# Patient Record
Sex: FEMALE | Race: WHITE
[De-identification: names, ages, dates, MRNs, and addresses within clinical notes are randomized per-mention and may not be internally consistent; named-entity substitution may affect disease eponyms.]

---

## 2017-03-09 ENCOUNTER — HOSPITAL ENCOUNTER (OUTPATIENT)
Dept: HOSPITAL 80 - FCATH | Age: 62
Discharge: HOME | End: 2017-03-09
Attending: INTERNAL MEDICINE
Payer: COMMERCIAL

## 2017-03-09 DIAGNOSIS — I48.0: ICD-10-CM

## 2017-03-09 DIAGNOSIS — I10: ICD-10-CM

## 2017-03-09 DIAGNOSIS — Z95.2: ICD-10-CM

## 2017-03-09 DIAGNOSIS — I44.2: ICD-10-CM

## 2017-03-09 DIAGNOSIS — I47.2: ICD-10-CM

## 2017-03-09 DIAGNOSIS — J38.00: ICD-10-CM

## 2017-03-09 DIAGNOSIS — I48.92: Primary | ICD-10-CM

## 2017-03-09 DIAGNOSIS — Z79.01: ICD-10-CM

## 2017-03-09 DIAGNOSIS — E03.9: ICD-10-CM

## 2017-03-09 DIAGNOSIS — I25.10: ICD-10-CM

## 2017-03-09 DIAGNOSIS — Z95.1: ICD-10-CM

## 2017-03-09 DIAGNOSIS — R49.0: ICD-10-CM

## 2017-03-09 DIAGNOSIS — Z86.718: ICD-10-CM

## 2017-03-09 DIAGNOSIS — D68.2: ICD-10-CM

## 2017-03-09 DIAGNOSIS — Z95.0: ICD-10-CM

## 2017-03-09 DIAGNOSIS — E78.00: ICD-10-CM

## 2017-03-09 LAB
% IMMATURE GRANULYOCYTES: 0.4 % (ref 0–1.1)
ABSOLUTE IMMATURE GRANULOCYTES: 0.03 10^3/UL (ref 0–0.1)
ABSOLUTE NRBC COUNT: 0 10^3/UL (ref 0–0.01)
ADD DIFF?: NO
ADD MORPH?: NO
ADD SCAN?: NO
ANION GAP SERPL CALC-SCNC: 13 MEQ/L (ref 8–16)
APTT BLD: 27.5 SEC (ref 23–38)
ATYPICAL LYMPHOCYTE FLAG: 10 (ref 0–99)
CALCIUM SERPL-MCNC: 10.1 MG/DL (ref 8.5–10.4)
CHLORIDE SERPL-SCNC: 106 MEQ/L (ref 97–110)
CO2 SERPL-SCNC: 26 MEQ/L (ref 22–31)
CREAT SERPL-MCNC: 1.2 MG/DL (ref 0.6–1)
ERYTHROCYTE [DISTWIDTH] IN BLOOD BY AUTOMATED COUNT: 16.3 % (ref 11.5–15.2)
FRAGMENT RBC FLAG: 0 (ref 0–99)
GFR SERPL CREATININE-BSD FRML MDRD: 46 ML/MIN/{1.73_M2}
GLUCOSE SERPL-MCNC: 100 MG/DL (ref 70–100)
HCT VFR BLD CALC: 39.6 % (ref 38–47)
HGB BLD-MCNC: 12.6 G/DL (ref 12.6–16.3)
INR PPP: 1.29 (ref 0.83–1.16)
LEFT SHIFT FLG: 0 (ref 0–99)
LIPEMIA HEMOLYSIS FLAG: 80 (ref 0–99)
MAGNESIUM SERPL-MCNC: 2 MG/DL (ref 1.6–2.3)
MCH RBC BLDCO QN: 24.8 PG (ref 27.9–34.1)
MCHC RBC AUTO-ENTMCNC: 31.8 G/DL (ref 32.4–36.7)
MCV RBC AUTO: 77.8 FL (ref 81.5–99.8)
NRBC-AUTO%: 0 % (ref 0–0.2)
PLATELET # BLD: 216 10^3/UL (ref 150–400)
PLATELET CLUMPS FLAG: 0 (ref 0–99)
PMV BLD AUTO: 9.2 FL (ref 8.7–11.7)
POTASSIUM SERPL-SCNC: 4.1 MEQ/L (ref 3.5–5.2)
PROTHROMBIN TIME: 16.1 SEC (ref 12–15)
RBC # BLD AUTO: 5.09 10^6/UL (ref 4.18–5.33)
SODIUM SERPL-SCNC: 145 MEQ/L (ref 134–144)

## 2017-03-09 PROCEDURE — 93005 ELECTROCARDIOGRAM TRACING: CPT

## 2017-03-09 PROCEDURE — 5A1223Z PERFORMANCE OF CARDIAC PACING, CONTINUOUS: ICD-10-PCS | Performed by: INTERNAL MEDICINE

## 2017-03-09 PROCEDURE — 02H63MZ INSERTION OF CARDIAC LEAD INTO RIGHT ATRIUM, PERCUTANEOUS APPROACH: ICD-10-PCS | Performed by: INTERNAL MEDICINE

## 2017-03-09 PROCEDURE — 4A023FZ MEASUREMENT OF CARDIAC RHYTHM, PERCUTANEOUS APPROACH: ICD-10-PCS | Performed by: INTERNAL MEDICINE

## 2017-03-09 PROCEDURE — 93613 INTRACARDIAC EPHYS 3D MAPG: CPT

## 2017-03-09 PROCEDURE — 93621 COMP EP EVL L PAC&REC C SINS: CPT

## 2017-03-09 PROCEDURE — C1730 CATH, EP, 19 OR FEW ELECT: HCPCS

## 2017-03-09 PROCEDURE — C1732 CATH, EP, DIAG/ABL, 3D/VECT: HCPCS

## 2017-03-09 PROCEDURE — 93623 PRGRMD STIMJ&PACG IV RX NFS: CPT

## 2017-03-09 PROCEDURE — 02K83ZZ MAP CONDUCTION MECHANISM, PERCUTANEOUS APPROACH: ICD-10-PCS | Performed by: INTERNAL MEDICINE

## 2017-03-09 PROCEDURE — 02H73MZ INSERTION OF CARDIAC LEAD INTO LEFT ATRIUM, PERCUTANEOUS APPROACH: ICD-10-PCS | Performed by: INTERNAL MEDICINE

## 2017-03-09 PROCEDURE — 02HK3MZ INSERTION OF CARDIAC LEAD INTO RIGHT VENTRICLE, PERCUTANEOUS APPROACH: ICD-10-PCS | Performed by: INTERNAL MEDICINE

## 2017-03-09 PROCEDURE — 93620 COMP EP EVL R AT VEN PAC&REC: CPT

## 2017-03-09 NOTE — CPEKG
Heart Rate: 79

RR Interval: 759

P-R Interval: 192

QRSD Interval: 88

QT Interval: 388

QTC Interval: 445

P Axis: -88

QRS Axis: -15

T Wave Axis: 65

EKG Severity - ABNORMAL ECG -

EKG Impression: ATRIAL-PACED COMPLEXES

EKG Impression: PROBABLE INFERIOR INFARCT, OLD

EKG Impression: CONSIDER ANTERIOR INFARCT

EKG Impression: COMPARED WITH 12/23/2016, ATRIAL PACING NOW PRESENT

Electronically Signed By: Jessica Sam 09-Mar-2017 15:14:29

## 2017-03-09 NOTE — EPPROC
Electrophysiology Procedure Note: 





DIAGNOSTIC ELECTROPHYSIOLOGIC STUDY 





Procedures performed:





* Fluoroscopy


* 85220-78 EP evaluation with RA/RV/LA pace/record, with arrhythmia induction


* 45079-60 EP evaluation with RA/RV pace record, insert/reposition catheter, 

with arrhythmia induction


* 3D mapping


4.   11666-62   Programmed stimulation + pacing after IV drug








INDICATION:





 THis is a 61 yr old female with episodes of atrial flutter and NSVT.  In view 

of this it was decided to perform EP study with plans to perform ablation for 

right sided flutter and eval for induction of sustained VT





PROCEDURE:





Catheters & Anesthesia: 





The patient arrived in the Electrophysiology Laboratory in the fasting state.  

The right clavicular region, right groin, & left groin area were prepped & 

draped in the usual sterile manner.  General anesthesia was administered.  

Appropriate non-invasive blood pressure, pulse oximetry & end-tidal CO2 

monitoring was established.


All catheters were placed percutaneously using the modified Seldinger technique

, and advanced into position under fluoroscopic guidance. One #6 Ukrainian 

decapolar catheter was palced in the CS via RFV, another decapolar catheter was 

placed in the ALRA position via RFV.  


One F curve thermacool catheter was placed in the Isthmus.


 





Programmed stimulation was performed from the right atrium, right ventricle and 

coronary sinus (left atrium).   


Sustained atrial arrhytmia was induced this was noted to be LA arrhythmia.


Mapping of the isthmus was performed during CS pacing and it was noted that 

there was no gap in the isthmus. Since the pt will have to continue with 

Coumadin even after LA tachycardia was ablated, it was deemed that pt will 

continue to have other ongoing arrhytmias from the LA considering the substrate 

and hence it was decided to continue with coumadin and discuss with the pt 

regarding use of AAD for LA arrhythmias








No sustained reentrant tachycardia was induced during programmed stimulation at 

baseline or during graded doses of isoproterenol up to 1  mcg/min.  





Ventricular programmed stimulation was performed using standard protocol (2 

pacing sites, 2 basic cycle lengths, up to 2 extrastimuli at twice pacing 

threshold).  Ventricular burst pacing and long/short sequence pacing was also 

performed.








The catheters were removed.  The patient was transferred to the cardiovascular 

holding area in stable condition.  Vascular access sheaths were removed in the 

holding area.  There were no apparent complications.





Results:





* LA arrhythmia


* No inducible sustained VT


* 





CONCLUSIONS





* Normal sinus and AV node function. 


* Sustained LA arrhythmia


* No sustained ventricular arrhythmias induced.


* No apparent complications.








Patient Problems: 


 Problems











Problem Status Onset


 


High degree atrioventricular block Acute  


 


S/P CABG x 1 Acute  10/24/16


 


S/P ablation of atrial fibrillation Acute  10/24/16


 


S/P aortic valve replacement with bioprosthetic valve Acute  10/24/16


 


S/P cardiac pacemaker procedure Acute  10/28/16


 


S/P mitral valve replacement with bioprosthetic valve Acute  10/24/16


 


CAD in native artery Chronic  


 


Dyslipidemia Chronic  


 


Factor V deficiency Chronic  


 


Obesity (BMI 30-39.9) Chronic  


 


Persistent atrial fibrillation Chronic

## 2017-04-01 ENCOUNTER — HOSPITAL ENCOUNTER (OUTPATIENT)
Dept: HOSPITAL 80 - FIMAGING | Age: 62
End: 2017-04-01
Attending: GENERAL ACUTE CARE HOSPITAL
Payer: COMMERCIAL

## 2017-04-01 DIAGNOSIS — Z80.3: ICD-10-CM

## 2017-04-01 DIAGNOSIS — Z12.31: Primary | ICD-10-CM

## 2017-04-01 PROCEDURE — G0202 SCR MAMMO BI INCL CAD: HCPCS

## 2017-08-04 ENCOUNTER — HOSPITAL ENCOUNTER (OUTPATIENT)
Dept: HOSPITAL 80 - FCATH | Age: 62
Discharge: HOME | End: 2017-08-04
Attending: INTERNAL MEDICINE
Payer: COMMERCIAL

## 2017-08-04 DIAGNOSIS — I25.10: ICD-10-CM

## 2017-08-04 DIAGNOSIS — Z95.0: ICD-10-CM

## 2017-08-04 DIAGNOSIS — I48.0: Primary | ICD-10-CM

## 2017-08-04 DIAGNOSIS — I10: ICD-10-CM

## 2017-08-04 DIAGNOSIS — Z95.2: ICD-10-CM

## 2017-08-04 DIAGNOSIS — D68.2: ICD-10-CM

## 2017-08-04 DIAGNOSIS — E78.5: ICD-10-CM

## 2017-08-04 DIAGNOSIS — Z79.01: ICD-10-CM

## 2017-08-04 LAB
ANION GAP SERPL CALC-SCNC: 13 MEQ/L (ref 8–16)
APTT BLD: 38.3 SEC (ref 23–38)
CALCIUM SERPL-MCNC: 10 MG/DL (ref 8.5–10.4)
CHLORIDE SERPL-SCNC: 106 MEQ/L (ref 97–110)
CO2 SERPL-SCNC: 23 MEQ/L (ref 22–31)
CREAT SERPL-MCNC: 1 MG/DL (ref 0.6–1)
GFR SERPL CREATININE-BSD FRML MDRD: 56 ML/MIN/{1.73_M2}
GLUCOSE SERPL-MCNC: 95 MG/DL (ref 70–100)
INR PPP: 2.56 (ref 0.83–1.16)
MAGNESIUM SERPL-MCNC: 1.9 MG/DL (ref 1.6–2.3)
POTASSIUM SERPL-SCNC: 4 MEQ/L (ref 3.5–5.2)
PROTHROMBIN TIME: 27.8 SEC (ref 12–15)
SODIUM SERPL-SCNC: 142 MEQ/L (ref 134–144)

## 2017-08-04 PROCEDURE — 5A2204Z RESTORATION OF CARDIAC RHYTHM, SINGLE: ICD-10-PCS | Performed by: INTERNAL MEDICINE

## 2017-08-04 NOTE — CPEKG
Heart Rate: 64

RR Interval: 938

P-R Interval: 176

QRSD Interval: 88

QT Interval: 436

QTC Interval: 450

QRS Axis: -22

T Wave Axis: 87

EKG Severity - ABNORMAL ECG -

EKG Impression: ATRIAL-PACED RHYTHM

EKG Impression: BORDERLINE LEFT AXIS DEVIATION

EKG Impression: CONSIDER ANTERIOR INFARCT

Electronically Signed By: Josse Mirza 05-Aug-2017 07:02:32

## 2017-08-04 NOTE — PDANEPAE
ANE History of Present Illness





s/p AVR MVR MAZE procedure 2016; now with luis ALEXANDER Past Medical History





- Cardiovascular History


Hx Hypertension: Yes


Hx Arrhythmias: Yes


Hx Chest Pain: No


Hx Coronary Artery / Peripheral Vascular Disease: Yes


Hx CHF / Valvular Disease: Yes


Hx Palpitations: No


Cardiovascular History Comment: PAF.  HYPERLIPIDEMIA.  HYPERCHOLESTEREMIA.  

HTN.  MITRAL REGURG AND STENOSIS.  AORTIC REGURG.  CAD.  HX OF DVT





- Pulmonary History


Hx COPD: No


Hx Asthma/Reactive Airway Disease: No


Hx Recent Upper Respiratory Infection: No


Hx Oxygen in Use at Home: No


Hx Sleep Apnea: No


Pulmonary History Comment: KAITY TRIGGERS NO DX





- Neurologic History


Hx Cerebrovascular Accident: No


Hx Seizures: No


Hx Dementia: No


Neurologic History Comment: PINCHED NERVE WITH SIATICA





- Endocrine History


Hx Diabetes: No


Endocrine History Comment: PRE-DIABETIC.  HYPOTHYROIDISM





- Renal History


Hx Renal Disorders: No


Renal History Comment: KEEPING AN EYE ON CREAT





- Liver History


Hx Hepatic Disorders: No





- Neurological & Psychiatric Hx


Hx Neurological and Psychiatric Disorders: No





- Cancer History


Hx Cancer: No





- Congenital Disorder History


Hx Congenital Disorders: No





- GI History


Hx Gastrointestinal Disorders: No





- Other Health History


Other Health History: FACTOR V LEIDEN





- Chronic Pain History


Chronic Pain: Yes (LOWER BACK PAIN)





- Surgical History


Prior Surgeries: NA





ANE Review of Systems





- Exercise capacity


Exercise capacity: >=4 METS





- Pacemaker


Pacemaker : RogateronidiaDexus





BENJAMIN Patient History





- Allergies


Allergies/Adverse Reactions: 








morphine Allergy (Intermediate, Verified 10/24/16 07:17)


 Vomiting








- Home Medications


Home Medications: 








Atenolol [Tenormin 25 mg (*)] 25 mg PO BID 01/27/11 [Last Taken 1 Day Ago]


Levothyroxine [Synthroid 75 mcg (*)] 75 mcg PO DAILY06 01/27/11 [Last Taken 1 

Day Ago]


Atorvastatin Calcium [Lipitor 40 mg (*)] 40 mg PO HS 09/30/16 [Last Taken 1 Day 

Ago]


Calcium Carbonate [Tums 500MG (*)] 1,000 mg PO DAILY 09/30/16 [Last Taken 1 Day 

Ago]


Herbals/Supplements -Info Only 1 ea PO DAILY 10/21/16 [Last Taken 1 Day Ago]








- Anes Hx


Anes Hx: no prior problems





- Smoking Hx


Smoking Status: Never smoked





- Family Anes Hx


Family Hx Anesthesia Complications: NONE





ANE Labs/Vital Signs





- Labs


Result Diagrams: 


 08/04/17 08:05





- Vital Signs


Height: 170.18 cm


Weight: 99.79 kg





ANE Physical Exam





- Airway


Neck exam: FROM


Mallampati Score: Class 1


Mouth exam: normal dental/mouth exam





- Pulmonary


Pulmonary: no respiratory distress





- Cardiovascular


Cardiovascular: irregularly irregular





- ASA Status


ASA Status: IV





ANE Anesthesia Plan


Anesthesia Plan: GA with mask

## 2017-08-04 NOTE — CPEKG
Heart Rate: 62

RR Interval: 968

QRSD Interval: 94

QT Interval: 464

QTC Interval: 472

QRS Axis: -17

T Wave Axis: 97

EKG Severity - ABNORMAL ECG -

EKG Impression: A-FLUTTER W/ PREDOM 4:1 AV BLOCK, A-RATE 245

EKG Impression: ST ELEVATION, PROBABLE INFERIOR INJURY

EKG Impression: CONSIDER ANTERIOR INFARCT

EKG Impression: ABNORMAL T, CONSIDER ISCHEMIA, ANT-LAT LEADS

Electronically Signed By: Josse Mirza 05-Aug-2017 07:02:48

## 2018-05-07 ENCOUNTER — HOSPITAL ENCOUNTER (OUTPATIENT)
Dept: HOSPITAL 80 - FIMAGING | Age: 63
End: 2018-05-07
Attending: FAMILY MEDICINE
Payer: COMMERCIAL

## 2018-05-07 DIAGNOSIS — Z80.3: ICD-10-CM

## 2018-05-07 DIAGNOSIS — Z12.31: Primary | ICD-10-CM

## 2018-11-09 NOTE — GHP
DATE OF ADMISSION:  2018



SCHEDULED DATE OF SURGERY:  2016, at 7:15 a.m.



SURGERY TO BE PERFORMED:  Hysteroscopy, dilation and curettage, polypectomy with morcellator.



PREOPERATIVE DIAGNOSIS:  Postmenopausal bleeding and endometrial polyps.



HISTORY OF PRESENT ILLNESS:  The patient is a 63-year-old,  2, para 2-0-0-2, who presented to 
my office complaining of episodes of postmenopausal bleeding.  She has had episodes of postmenopausal
 bleeding over the last couple years.  She had seen another provider in  who because of this prob
rachel ordered an ultrasound that diagnosed a thickened endometrium.  She recommended patient have a cou
rse of oral progesterone to thin her endometrium because of bleed, and patient was reluctant to do th
is because of her complicated medical history and concern about hormones; and so she did not do this 
therapy and did not follow up.  Over the last 2 years she has had episodes of postmenopausal bleeding
 which increased substantially in the last month.  She had 1 week of heavy flow that was bright red, 
prior had been more significant for spotting.  My office, we proceeded with an ultrasound, and ultras
ound revealed a heterogeneous, thickened endometrium which was 1.81 cm, had multiple cystic areas thr
oughout, had good flow; ovaries were not well seen, but no masses in her adnexa.  I performed an endo
metrial biopsy which showed endometrial polyp fragments and some atrophic endometrium but no evidence
 of hyperplasia or malignancy, no atypical cells.  We discussed treatment options, and I still feel t
hat she needs surgical management with a hysteroscopy, D and C and polypectomy to ensure that we have
 sampled the entire endometrium and completely ruled out the possibility of endometrial malignancy as
 well as treatment of the polyp and hopefully prevention of further postmenopausal bleeding episodes 
for her.  She is in agreement and wants to proceed with this course.  



The patient has a complex medical history with mainly cardiac issues.  She has coronary artery diseas
e and valvular disease.  She underwent an aortic valve replacement and a coronary artery bypass graft
 surgery with her internal mammary artery in 2016 as well as a mitral valve replacement an
d a Maze procedure.  She had a pacemaker placed following this procedure because of episodes of atria
l fibrillation and has an internal defibrillator.  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
r different years, and she is positive for Factor V Leiden.  She is on Coumadin for life because of h
er hypercoagulable disorder as well as her mechanical heart valve.  She also has hypertension, hyperc
holesterolemia, hypothyroidism and a history of rheumatic fever in childhood.  She is followed by Dr. Juan Roman at [facility], who did give cardiac clearance for this surgery.  She had a recent car
diac catheterization in , demonstrated no significant obstructive disease.  She is moderately act
neymar without angina.  He does recommend prophylactic antibiotics prior to surgery because of her prost
hetic heart valves, and she will require pacemaker check following her surgery, which they will arran
ge.  She is on Coumadin for her atrial fibrillation, but she has not had any further episodes of A-fi
b on her pacemaker check.  He gave permission for her to hold her Coumadin for 4 days prior to surger
y and will resume as appropriate postoperatively.



PAST SURGICAL HISTORY:  The above-mentioned cardiac procedure in 2016, aortic and mitral v
alve replacement, coronary artery bypass graft procedure, Maze procedure and pacemaker placement, and
 that is her only significant surgery.



ALLERGIES:  She has no known drug allergies.



PAST OBSTETRICAL HISTORY:  She had 2 full-term vaginal deliveries in  and .  She did have pre
eclampsia and severe hypertension with her first baby in  and was placed on medications temporari
ly.  No other pregnancies.



PAST GYNECOLOGICAL HISTORY:  She went through menopause at age 51.  She had normal periods in her lif
e.  Used oral contraceptive pills prior to her DVT and stopped after that.  She is , has been 
 for 38 years.  Remains sexually active with some vaginal dryness but no significant issues.  
She did have a history of an abnormal Pap smear and had a cryotherapy to her cervix in , had no a
bnormal Paps since; and has had a Pap in , and I performed a Pap in 2018, which was neg
ative, as well as HPV.  Patient has regular mammograms.  Is up to date on colonoscopy as well as a lydia
ne density.



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  
e denies tobacco use, 1 alcohol drink a week, no drug use.  She, again, is , lives with her 
alexa.



FAMILY HISTORY:  Significant for cardiovascular disease.  Her mother had a stroke at age 68.  Two bro
thers:  One is age 37; one age 50 had myocardial infarction, hypercholesterolemia.  Hypertension in b
oth her parents and her brother.  Type 2 diabetes in her father, her mother and her sister, and that 
is all.



OBJECTIVE:  VITALS:  Patient's blood pressure 124/76, weight is 233 pounds.  She is 5 feet, 4-1/2 inc
hes.  GENERAL:  She is a well-developed, obese, white female, no acute distress.  LUNGS:  Clear to au
scultation bilaterally.  HEART:  Regular rhythm.  No murmurs.  ABDOMEN:  Soft, nontender, nondistende
d.  Normal bowel sounds.  PELVIC:  Atrophic external genitalia:  No lesions or abnormalities assessed
.  Normal parous cervix.  No obvious bleeding.  Uterus is anteverted, anteflexed, mobile, nontender, 
nondistended.



ASSESSMENT AND PLAN:  63-year-old  2, para 2-0-0-2, with episodes of postmenopausal bleeding, 
thickened endometrium on ultrasound and endometrial polyp revealed on endometrial biopsy.  She will u
ndergo hysteroscopy, dilation and curettage, polypectomy with morcellator.  Patient was consented for
 the procedure.  She understood the risks and benefits, the risks including bleeding, infection, brittany
ge to the uterus including possible risk of perforation, damage to other organs if perforation were t
o occur and need for additional procedures especially if this is a cancer diagnosis.  Patient is awar
e of risks of anesthesia including her cardiac risk, and her cardiologist feels that this procedure i
s safe for her.





Job #:  [id number]

## 2018-11-16 ENCOUNTER — HOSPITAL ENCOUNTER (OUTPATIENT)
Dept: HOSPITAL 80 - FSGY | Age: 63
Discharge: HOME | End: 2018-11-16
Attending: OBSTETRICS & GYNECOLOGY
Payer: COMMERCIAL

## 2018-11-16 VITALS — SYSTOLIC BLOOD PRESSURE: 151 MMHG | DIASTOLIC BLOOD PRESSURE: 97 MMHG

## 2018-11-16 DIAGNOSIS — Z95.0: ICD-10-CM

## 2018-11-16 DIAGNOSIS — Z95.1: ICD-10-CM

## 2018-11-16 DIAGNOSIS — Z86.718: ICD-10-CM

## 2018-11-16 DIAGNOSIS — Z95.2: ICD-10-CM

## 2018-11-16 DIAGNOSIS — N84.0: Primary | ICD-10-CM

## 2018-11-16 DIAGNOSIS — D68.51: ICD-10-CM

## 2018-11-16 DIAGNOSIS — Z79.01: ICD-10-CM

## 2018-11-16 DIAGNOSIS — I10: ICD-10-CM

## 2018-11-16 LAB
INR PPP: 0.98 (ref 0.83–1.16)
PROTHROMBIN TIME: 13.2 SEC (ref 12–15)

## 2018-11-16 PROCEDURE — C1782 MORCELLATOR: HCPCS

## 2018-11-16 PROCEDURE — 58558 HYSTEROSCOPY BIOPSY: CPT

## 2018-11-16 PROCEDURE — 0UB98ZZ EXCISION OF UTERUS, VIA NATURAL OR ARTIFICIAL OPENING ENDOSCOPIC: ICD-10-PCS | Performed by: OBSTETRICS & GYNECOLOGY

## 2018-11-16 NOTE — GOP
DATE OF OPERATION:  2018



SURGEON:  Kym Cartagena MD



ANESTHESIA:  General anesthesia with an LMA.



ANESTHESIOLOGIST:  Paras Echeverria MD.



PREOPERATIVE DIAGNOSIS:  Postmenopausal bleeding and endometrial polyps.



POSTOPERATIVE DIAGNOSIS:  Postmenopausal bleeding and endometrial polyps.



PROCEDURE PERFORMED:  Hysteroscopy, dilation, curettage, and polypectomy.



FINDINGS:  



SPECIMENS:  Pathologic specimen will be endometrial polyps, endometrial curettings.



ESTIMATED BLOOD LOSS:  Less than 10 cc.



INDICATIONS:  The patient is a 63-year-old  2, para 2-0-0-2, who presented complaining of epis
odes of postmenopausal bleeding that had been going on for the last few years.  She had seen another 
provider in  who ordered an ultrasound and diagnosed a thickened endometrium.  She was recommende
d to have an oral course of progesterone to thin her endometrium and cause of bleed and the patient w
as reluctant to do this because of her complicated medical history including positive thrombophilia, 
a history of DVTs and history of mechanical heart valve.  She continued to have episodic postmenopaus
al bleeding until she saw me.  I repeated an ultrasound which revealed a heterogeneous uterus with a 
thickened endometrium that was 1.81 cm, multiple cystic areas throughout, had good flow.  Ovaries wer
e not seen well but no masses in her adnexa.  I performed an endometrial biopsy which showed endometr
ial polyp fragments and atrophic endometrium but no evidence of hyperplasia or malignancy, no atypica
l cells.  We discussed treatment options and I recommended a hysteroscopy, dilation and curettage to 
remove the polyps, treat the postmenopausal bleeding and give a clear and definitive diagnosis to rul
e out endometrial malignancy.  The patient wants to proceed with this course.  She was consented for 
the procedure.  She understood the risks and benefits the risks including bleeding, infection, damage
 to the uterus including possible risk of perforation, damage to other organs if perforation was to o
ccur, and need for additional procedures, especially with malignancy diagnosis.  She understood these
 risks and benefits agreed to proceed.



DESCRIPTION OF PROCEDURE:  Patient was taken to the operating room where she was placed under MAC sed
ation with an LMA.  She was prepped and draped in the dorsal lithotomy position and she had previousl
y just drained her bladder.  After a WHO time-out was performed an open-sided speculum was placed in 
the vagina, and a single-tooth tenaculum was used to grasp the anterior lip of the cervix.  The uteru
s sounded to 7 cm.  The cervix was then progressively dilated with Lemus dilators to size 6.5.  The h
ysteroscope was calibrated and then gently advanced from the cervix to the fundus.  With visual inspe
ction there were multiple polyps seen at the level of the internal os as well as deeper in the uterus
 at the anterior wall near the fundus.  We took pictures of these areas.  We used the polyp blade, mo
rcellator was then advanced through the operative channel of the hysteroscope.  A window lock was per
formed and morcellation was performed under direct visualization through the entire endometrial cavit
y.  There was good hemostasis, no bleeding and final photos revealed an open urine cavity with an atr
ophic endometrial and both tubal ostia were visualized.  The hysteroscope was removed.  The tenaculum
 was removed.  She had good hemostasis and the speculum was removed.  The patient tolerated the proce
dure well.  Sponge, lap, needle, and instrument counts were correct x2.  Patient went to the recovery
 room in good condition.



IV FLUIDS:  600 cc.



URINE OUTPUT:  Not measured.





Job #:  [id number]

## 2018-11-16 NOTE — PDANEPAE
ANE Past Medical History





- Cardiovascular History


Hx Hypertension: Yes


Hx Arrhythmias: Yes


Hx Chest Pain: No


Hx Coronary Artery / Peripheral Vascular Disease: Yes


Hx CHF / Valvular Disease: Yes


Hx Palpitations: No


Cardiovascular History Comment: A-FIB.  HYPERLIPIDEMIA.  HYPERCHOLESTEREMIA.  S/

P AVR MVR and CABG in 2016 with tissue valves. On coumadin for afib but is 

currently off with normal INR. Gets around without CP or SOB since surgery. 

recent Cath shows patent grafts per patient.  .  





- Pulmonary History


Hx COPD: No


Hx Asthma/Reactive Airway Disease: No


Hx Recent Upper Respiratory Infection: No


Hx Oxygen in Use at Home: No


Hx Sleep Apnea: No


Sleep Apnea Screening Result - Last Documented: Positive


Pulmonary History Comment: KAITY TRIGGERS NO DX





- Neurologic History


Hx Cerebrovascular Accident: No


Hx Seizures: No


Hx Dementia: No


Neurologic History Comment: DDD/TONY'S.  LT SIATICA WITH N/T LT LEG





- Endocrine History


Hx Diabetes: No


Hypothyroid: Yes


Endocrine History Comment: PRE-DIABETIC.  HYPOTHYROIDISM





- Renal History


Hx Renal Disorders: No


Renal History Comment: KEEPING AN EYE ON CREAT





- Liver History


Hx Hepatic Disorders: No





- Neurological & Psychiatric Hx


Hx Neurological and Psychiatric Disorders: No





- Cancer History


Hx Cancer: No





- Congenital Disorder History


Hx Congenital Disorders: No





- GI History


Hx Gastrointestinal Disorders: No





- Other Health History


Other Health History: FACTOR V LEIDEN.  HX MARIELY DVT'S.  UTERINE POLYP/INCREASED 

BLEEDING





- Chronic Pain History


Chronic Pain: Yes (N/T LT LEG AND SCIATICA)





- Surgical History


Prior Surgeries: AORTIC/MITRAL VALVE REPLACEMENT.  GABGX1.  MAZE.  PACEMAKER 

2016





ANE Review of Systems


Review of Systems: 








- Exercise capacity


METS (RN): 3 METS





- Pacemaker


Pacemaker : Biotronik


Pacemaker Model: ETRINSA 8 DR-T


Date Pacemaker Last Checked: 11/12/18





ANE Patient History





- Allergies


Allergies/Adverse Reactions: 








morphine Allergy (Intermediate, Verified 11/16/18 06:26)


 Vomiting








- Home Medications


Home Medications: 








RX: Atenolol [Tenormin 25 mg (*)] 25 mg PO DAILY 01/27/11 [Last Taken 1 Day Ago 

~08/03/17]


RX: Levothyroxine [Synthroid 75 mcg (*)] 75 mcg PO DAILY06 01/27/11 [Last Taken 

1 Day Ago ~08/03/17]


RX: Atorvastatin Calcium [Lipitor 40 mg (*)] 40 mg PO HS 09/30/16 [Last Taken 1 

Day Ago ~08/03/17]


RX: Calcium Carbonate [Tums 500MG (*)] 1,000 mg PO HS 09/30/16 [Last Taken 1 

Day Ago ~08/03/17]


RX: Herbals/Supplements -Info Only 1 ea PO DAILY 10/21/16 [Last Taken 1 Day Ago 

~08/03/17]


Amoxicillin ONCE 11/09/18 [Last Taken Unknown]


Aspirin DAILY 11/09/18 [Last Taken Unknown]








- NPO status


NPO Since - Liquids (Date): 11/16/18


NPO Since - Liquids (Time): 04:00


NPO Since - Solids (Date): 11/15/18


NPO Since - Solids (Time): 20:30





- Anes Hx


Hx Anesthesia Complications (with details): Patient had a vocal cord paralysis 

which was thought to be due to a viral infection. After her Open Heart surgery 

she lost her voice and it took her a long time to regain it. She saw an ENT and 

per patient they stated that her vocal problems are likely due to a combination 

of vc paralysis and perhaps some structural changes after prolonged intubation 

after open heart surgery.





- Smoking Hx


Smoking Status: Never smoked





- Family Anes Hx


Family Hx Anesthesia Complications: NONE





ANE Labs/Vital Signs





- Vital Signs


Blood Pressure: 168/88


Heart Rate: 71


Respiratory Rate: 18


O2 Sat (%): 94


Height: 167.64 cm


Weight: 104.326 kg





ANE Physical Exam





- Airway


Neck exam: FROM


Mallampati Score: Class 2


Mouth exam: normal dental/mouth exam





- Pulmonary


Pulmonary: no respiratory distress, no rales or rhonchi, clear to auscultation





- Cardiovascular


Cardiovascular: regular rate and rhythym, systolic murmur





- ASA Status


ASA Status: III





ANE Anesthesia Plan


Anesthesia Plan: GA w LMA (Primary plan is MAC. Backup plan is GA with LMA. I 

informed her that an ETT was possible but not likely needed today for her 

anesthesia.), MAC

## 2018-11-16 NOTE — POSTOPPROG
Post Op Note


Date of Operation: 11/16/18


Surgeon: Kym Cartagena


Anesthesiologist: Dr. Paras Echeverria


Anesthesia: GET(General Endotracheal)


Pre-op Diagnosis: post menopausal bleeding, endometrial polyp


Post-op Diagnosis: same


Procedure: Hysteroscopy dilation and curettage polypectomy 


Findings: endometrial polyps


Inf/Abcess present in the surg proc area at time of surgery?: No


Depth: Organ Space


EBL: Minimal


Total fluids administered: 700


Complications: 





none


Specimen(s): 





endometrial polyps, endometrial curettings.

## 2019-06-19 ENCOUNTER — HOSPITAL ENCOUNTER (OUTPATIENT)
Dept: HOSPITAL 80 - FIMAGING | Age: 64
End: 2019-06-19
Payer: COMMERCIAL

## 2019-06-29 ENCOUNTER — HOSPITAL ENCOUNTER (OUTPATIENT)
Dept: HOSPITAL 80 - FIMAGING | Age: 64
End: 2019-06-29
Payer: COMMERCIAL